# Patient Record
Sex: MALE | Race: BLACK OR AFRICAN AMERICAN | NOT HISPANIC OR LATINO
[De-identification: names, ages, dates, MRNs, and addresses within clinical notes are randomized per-mention and may not be internally consistent; named-entity substitution may affect disease eponyms.]

---

## 2018-01-16 ENCOUNTER — NON-APPOINTMENT (OUTPATIENT)
Age: 28
End: 2018-01-16

## 2018-01-16 ENCOUNTER — LABORATORY RESULT (OUTPATIENT)
Age: 28
End: 2018-01-16

## 2018-01-16 ENCOUNTER — APPOINTMENT (OUTPATIENT)
Dept: INTERNAL MEDICINE | Facility: CLINIC | Age: 28
End: 2018-01-16
Payer: COMMERCIAL

## 2018-01-16 VITALS
BODY MASS INDEX: 19.04 KG/M2 | TEMPERATURE: 98.4 F | HEART RATE: 74 BPM | DIASTOLIC BLOOD PRESSURE: 61 MMHG | HEIGHT: 71 IN | OXYGEN SATURATION: 99 % | WEIGHT: 136 LBS | SYSTOLIC BLOOD PRESSURE: 122 MMHG

## 2018-01-16 DIAGNOSIS — F17.200 NICOTINE DEPENDENCE, UNSPECIFIED, UNCOMPLICATED: ICD-10-CM

## 2018-01-16 DIAGNOSIS — Z78.9 OTHER SPECIFIED HEALTH STATUS: ICD-10-CM

## 2018-01-16 DIAGNOSIS — Z11.3 ENCOUNTER FOR SCREENING FOR INFECTIONS WITH A PREDOMINANTLY SEXUAL MODE OF TRANSMISSION: ICD-10-CM

## 2018-01-16 DIAGNOSIS — R00.1 BRADYCARDIA, UNSPECIFIED: ICD-10-CM

## 2018-01-16 DIAGNOSIS — D22.9 MELANOCYTIC NEVI, UNSPECIFIED: ICD-10-CM

## 2018-01-16 PROCEDURE — 93000 ELECTROCARDIOGRAM COMPLETE: CPT

## 2018-01-16 PROCEDURE — 99385 PREV VISIT NEW AGE 18-39: CPT | Mod: 25

## 2018-01-16 PROCEDURE — 92552 PURE TONE AUDIOMETRY AIR: CPT

## 2018-01-16 PROCEDURE — G0444 DEPRESSION SCREEN ANNUAL: CPT

## 2018-01-17 LAB
25(OH)D3 SERPL-MCNC: 12.7 NG/ML
ALBUMIN SERPL ELPH-MCNC: 4.3 G/DL
ALP BLD-CCNC: 46 U/L
ALT SERPL-CCNC: 5 U/L
ANION GAP SERPL CALC-SCNC: 11 MMOL/L
APPEARANCE: CLEAR
AST SERPL-CCNC: 13 U/L
BASOPHILS # BLD AUTO: 0.04 K/UL
BASOPHILS NFR BLD AUTO: 1.5 %
BILIRUB SERPL-MCNC: 0.6 MG/DL
BILIRUBIN URINE: NEGATIVE
BLOOD URINE: NEGATIVE
BUN SERPL-MCNC: 11 MG/DL
C TRACH RRNA SPEC QL NAA+PROBE: NOT DETECTED
CALCIUM SERPL-MCNC: 9.7 MG/DL
CHLORIDE SERPL-SCNC: 103 MMOL/L
CHOLEST SERPL-MCNC: 157 MG/DL
CHOLEST/HDLC SERPL: 2.4 RATIO
CO2 SERPL-SCNC: 28 MMOL/L
COLOR: YELLOW
CREAT SERPL-MCNC: 0.98 MG/DL
EOSINOPHIL # BLD AUTO: 0.13 K/UL
EOSINOPHIL NFR BLD AUTO: 4.8 %
GLUCOSE QUALITATIVE U: NEGATIVE MG/DL
GLUCOSE SERPL-MCNC: 90 MG/DL
HBA1C MFR BLD HPLC: 5.6 %
HCT VFR BLD CALC: 41.9 %
HCV AB SER QL: NONREACTIVE
HCV S/CO RATIO: 0.07 S/CO
HDLC SERPL-MCNC: 65 MG/DL
HGB BLD-MCNC: 14 G/DL
HIV1+2 AB SPEC QL IA.RAPID: NONREACTIVE
HSV 1+2 IGG SER IA-IMP: NEGATIVE
HSV 1+2 IGG SER IA-IMP: NEGATIVE
HSV1 IGG SER QL: 0.11 INDEX
HSV2 IGG SER QL: 0.2 INDEX
IMM GRANULOCYTES NFR BLD AUTO: 0 %
KETONES URINE: NEGATIVE
LDLC SERPL CALC-MCNC: 79 MG/DL
LEUKOCYTE ESTERASE URINE: NEGATIVE
LYMPHOCYTES # BLD AUTO: 0.98 K/UL
LYMPHOCYTES NFR BLD AUTO: 35.9 %
MAN DIFF?: NORMAL
MCHC RBC-ENTMCNC: 30.7 PG
MCHC RBC-ENTMCNC: 33.4 GM/DL
MCV RBC AUTO: 91.9 FL
MONOCYTES # BLD AUTO: 0.33 K/UL
MONOCYTES NFR BLD AUTO: 12.1 %
N GONORRHOEA RRNA SPEC QL NAA+PROBE: NOT DETECTED
NEUTROPHILS # BLD AUTO: 1.25 K/UL
NEUTROPHILS NFR BLD AUTO: 45.7 %
NITRITE URINE: NEGATIVE
PH URINE: 5.5
PLATELET # BLD AUTO: 126 K/UL
POTASSIUM SERPL-SCNC: 3.9 MMOL/L
PROT SERPL-MCNC: 7.1 G/DL
PROTEIN URINE: NEGATIVE MG/DL
PSA SERPL-MCNC: 0.94 NG/ML
RBC # BLD: 4.56 M/UL
RBC # FLD: 13.7 %
SODIUM SERPL-SCNC: 142 MMOL/L
SOURCE AMPLIFICATION: NORMAL
SPECIFIC GRAVITY URINE: 1.02
T PALLIDUM AB SER QL IA: NEGATIVE
T4 FREE SERPL-MCNC: 1.1 NG/DL
TRIGL SERPL-MCNC: 67 MG/DL
TSH SERPL-ACNC: 0.77 UIU/ML
UROBILINOGEN URINE: NEGATIVE MG/DL
WBC # FLD AUTO: 2.73 K/UL

## 2018-01-17 RX ORDER — ERGOCALCIFEROL 1.25 MG/1
1.25 MG CAPSULE, LIQUID FILLED ORAL
Qty: 12 | Refills: 3 | Status: ACTIVE | COMMUNITY
Start: 2018-01-17 | End: 1900-01-01

## 2018-02-05 ENCOUNTER — APPOINTMENT (OUTPATIENT)
Dept: PULMONOLOGY | Facility: CLINIC | Age: 28
End: 2018-02-05
Payer: COMMERCIAL

## 2018-02-05 VITALS
TEMPERATURE: 97.4 F | HEIGHT: 71 IN | DIASTOLIC BLOOD PRESSURE: 70 MMHG | WEIGHT: 136 LBS | SYSTOLIC BLOOD PRESSURE: 114 MMHG | HEART RATE: 85 BPM | OXYGEN SATURATION: 97 % | BODY MASS INDEX: 19.04 KG/M2

## 2018-02-05 PROCEDURE — 99204 OFFICE O/P NEW MOD 45 MIN: CPT | Mod: 25

## 2018-02-05 PROCEDURE — 94010 BREATHING CAPACITY TEST: CPT

## 2018-02-15 ENCOUNTER — LABORATORY RESULT (OUTPATIENT)
Age: 28
End: 2018-02-15

## 2018-02-15 ENCOUNTER — APPOINTMENT (OUTPATIENT)
Dept: HEMATOLOGY ONCOLOGY | Facility: CLINIC | Age: 28
End: 2018-02-15
Payer: COMMERCIAL

## 2018-02-15 VITALS
SYSTOLIC BLOOD PRESSURE: 107 MMHG | DIASTOLIC BLOOD PRESSURE: 74 MMHG | WEIGHT: 130 LBS | TEMPERATURE: 97.9 F | HEART RATE: 82 BPM | OXYGEN SATURATION: 98 % | HEIGHT: 71 IN | RESPIRATION RATE: 14 BRPM | BODY MASS INDEX: 18.2 KG/M2

## 2018-02-15 PROCEDURE — 36415 COLL VENOUS BLD VENIPUNCTURE: CPT

## 2018-02-15 PROCEDURE — 99204 OFFICE O/P NEW MOD 45 MIN: CPT | Mod: 25

## 2018-02-15 RX ORDER — MULTIVIT-MIN/IRON/FOLIC ACID/K 18-600-40
CAPSULE ORAL DAILY
Refills: 0 | Status: ACTIVE | COMMUNITY

## 2018-02-16 ENCOUNTER — LABORATORY RESULT (OUTPATIENT)
Age: 28
End: 2018-02-16

## 2018-02-20 LAB
BASOPHILS NFR BLD AUTO: 2.2 %
EOSINOPHIL NFR BLD AUTO: 5.3 %
HCT VFR BLD CALC: 41.2 %
HGB BLD-MCNC: 13.9 G/DL
HIV1+2 AB SPEC QL IA.RAPID: NONREACTIVE
LYMPHOCYTES NFR BLD AUTO: 39.2 %
MAN DIFF?: NO
MCHC RBC-ENTMCNC: 30.1 PG
MCHC RBC-ENTMCNC: 33.7 G/DL
MCV RBC AUTO: 89.2 FL
MONOCYTES NFR BLD AUTO: 16.7 %
NEUTROPHILS NFR BLD AUTO: 36.6 %
PLATELET # BLD AUTO: 139 K/UL
RBC # BLD: 4.62 M/UL
RBC # FLD: 13.2 %
VIT B12 SERPL-MCNC: 536 PG/ML
WBC # FLD AUTO: 3.6 K/UL

## 2018-03-05 ENCOUNTER — APPOINTMENT (OUTPATIENT)
Dept: HEMATOLOGY ONCOLOGY | Facility: CLINIC | Age: 28
End: 2018-03-05
Payer: COMMERCIAL

## 2018-03-05 ENCOUNTER — LABORATORY RESULT (OUTPATIENT)
Age: 28
End: 2018-03-05

## 2018-03-05 PROCEDURE — 36415 COLL VENOUS BLD VENIPUNCTURE: CPT

## 2018-03-13 ENCOUNTER — TRANSCRIPTION ENCOUNTER (OUTPATIENT)
Age: 28
End: 2018-03-13

## 2018-07-23 PROBLEM — Z78.9 ALCOHOL USE: Status: ACTIVE | Noted: 2018-01-16

## 2019-01-18 ENCOUNTER — APPOINTMENT (OUTPATIENT)
Dept: INTERNAL MEDICINE | Facility: CLINIC | Age: 29
End: 2019-01-18
Payer: COMMERCIAL

## 2019-01-18 VITALS
BODY MASS INDEX: 18.55 KG/M2 | TEMPERATURE: 98.5 F | SYSTOLIC BLOOD PRESSURE: 110 MMHG | OXYGEN SATURATION: 98 % | HEART RATE: 89 BPM | WEIGHT: 133 LBS | DIASTOLIC BLOOD PRESSURE: 68 MMHG

## 2019-01-18 DIAGNOSIS — Z00.00 ENCOUNTER FOR GENERAL ADULT MEDICAL EXAMINATION W/OUT ABNORMAL FINDINGS: ICD-10-CM

## 2019-01-18 DIAGNOSIS — D69.3 IMMUNE THROMBOCYTOPENIC PURPURA: ICD-10-CM

## 2019-01-18 DIAGNOSIS — J93.83 OTHER PNEUMOTHORAX: ICD-10-CM

## 2019-01-18 DIAGNOSIS — D72.819 DECREASED WHITE BLOOD CELL COUNT, UNSPECIFIED: ICD-10-CM

## 2019-01-18 DIAGNOSIS — Z11.3 ENCOUNTER FOR SCREENING FOR INFECTIONS WITH A PREDOMINANTLY SEXUAL MODE OF TRANSMISSION: ICD-10-CM

## 2019-01-18 DIAGNOSIS — E55.9 VITAMIN D DEFICIENCY, UNSPECIFIED: ICD-10-CM

## 2019-01-18 PROCEDURE — 99395 PREV VISIT EST AGE 18-39: CPT | Mod: 25

## 2019-01-18 PROCEDURE — 36415 COLL VENOUS BLD VENIPUNCTURE: CPT

## 2019-01-18 NOTE — HISTORY OF PRESENT ILLNESS
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 PTX\par - was evaluated by pulm, records reviewed and cleared for EMT work\par - breathing is stable\par \par leukopenia/thrombocytopenia\par - evaluated by heme and cleared for EMT work\par \par HCM\par - flu up to date\par - needs letter stating can have short beard during EMT work.

## 2019-01-18 NOTE — HEALTH RISK ASSESSMENT
[Excellent] : ~his/her~  mood as  excellent [] : Yes [No falls in past year] : Patient reported no falls in the past year [0] : 2) Feeling down, depressed, or hopeless: Not at all (0) [de-identified] : KAREN [[id number]] : 0 [Change in mental status noted] : No change in mental status noted [Language] : denies difficulty with language [Behavior] : denies difficulty with behavior [Learning/Retaining New Information] : denies difficulty learning/retaining new information [Handling Complex Tasks] : denies difficulty handling complex tasks [Reasoning] : denies difficulty with reasoning [Spatial Ability and Orientation] : denies difficulty with spatial ability and orientation [College] : College [Sexually Active] : sexually active [High Risk Behavior] : no high risk behavior [Fully functional (bathing, dressing, toileting, transferring, walking, feeding)] : Fully functional (bathing, dressing, toileting, transferring, walking, feeding) [Fully functional (using the telephone, shopping, preparing meals, housekeeping, doing laundry, using] : Fully functional and needs no help or supervision to perform IADLs (using the telephone, shopping, preparing meals, housekeeping, doing laundry, using transportation, managing medications and managing finances) [Reports changes in hearing] : Reports no changes in hearing [Reports changes in vision] : Reports no changes in vision [Reports changes in dental health] : Reports no changes in dental health [Discussed at today's visit] : Advance Directives Discussed at today's visit

## 2019-01-24 LAB
25(OH)D3 SERPL-MCNC: 49.3 NG/ML
ALBUMIN SERPL ELPH-MCNC: 5.1 G/DL
ALP BLD-CCNC: 56 U/L
ALT SERPL-CCNC: 12 U/L
ANION GAP SERPL CALC-SCNC: 12 MMOL/L
APPEARANCE: CLEAR
AST SERPL-CCNC: 18 U/L
BASOPHILS # BLD AUTO: 0.06 K/UL
BASOPHILS NFR BLD AUTO: 1.8 %
BILIRUB SERPL-MCNC: 0.4 MG/DL
BILIRUBIN URINE: NEGATIVE
BLOOD URINE: NEGATIVE
BUN SERPL-MCNC: 12 MG/DL
C TRACH RRNA SPEC QL NAA+PROBE: NOT DETECTED
CALCIUM SERPL-MCNC: 10.3 MG/DL
CHLORIDE SERPL-SCNC: 99 MMOL/L
CHOLEST SERPL-MCNC: 171 MG/DL
CHOLEST/HDLC SERPL: 2.4 RATIO
CO2 SERPL-SCNC: 28 MMOL/L
COLOR: YELLOW
CREAT SERPL-MCNC: 0.89 MG/DL
EOSINOPHIL # BLD AUTO: 0.11 K/UL
EOSINOPHIL NFR BLD AUTO: 3.3 %
GLUCOSE QUALITATIVE U: NEGATIVE MG/DL
GLUCOSE SERPL-MCNC: 86 MG/DL
HBA1C MFR BLD HPLC: 5.8 %
HCT VFR BLD CALC: 40.8 %
HDLC SERPL-MCNC: 71 MG/DL
HGB BLD-MCNC: 13.4 G/DL
HIV1+2 AB SPEC QL IA.RAPID: NONREACTIVE
HSV 1+2 IGG SER IA-IMP: NEGATIVE
HSV 1+2 IGG SER IA-IMP: NEGATIVE
HSV1 IGG SER QL: 0.13 INDEX
HSV2 IGG SER QL: 0.31 INDEX
IMM GRANULOCYTES NFR BLD AUTO: 0.3 %
KETONES URINE: NEGATIVE
LDLC SERPL CALC-MCNC: 92 MG/DL
LEUKOCYTE ESTERASE URINE: NEGATIVE
LYMPHOCYTES # BLD AUTO: 1.4 K/UL
LYMPHOCYTES NFR BLD AUTO: 42.6 %
MAN DIFF?: NORMAL
MCHC RBC-ENTMCNC: 29.5 PG
MCHC RBC-ENTMCNC: 32.8 GM/DL
MCV RBC AUTO: 89.7 FL
MONOCYTES # BLD AUTO: 0.29 K/UL
MONOCYTES NFR BLD AUTO: 8.8 %
N GONORRHOEA RRNA SPEC QL NAA+PROBE: NOT DETECTED
NEUTROPHILS # BLD AUTO: 1.42 K/UL
NEUTROPHILS NFR BLD AUTO: 43.2 %
NITRITE URINE: NEGATIVE
PH URINE: 5.5
PLATELET # BLD AUTO: 137 K/UL
POTASSIUM SERPL-SCNC: 3.6 MMOL/L
PROT SERPL-MCNC: 8.4 G/DL
PROTEIN URINE: NEGATIVE MG/DL
PSA SERPL-MCNC: 0.9 NG/ML
RBC # BLD: 4.55 M/UL
RBC # FLD: 14.6 %
SODIUM SERPL-SCNC: 139 MMOL/L
SOURCE AMPLIFICATION: NORMAL
SPECIFIC GRAVITY URINE: 1.02
T PALLIDUM AB SER QL IA: NEGATIVE
T4 FREE SERPL-MCNC: 1.2 NG/DL
TRIGL SERPL-MCNC: 40 MG/DL
TSH SERPL-ACNC: 1.32 UIU/ML
UROBILINOGEN URINE: NEGATIVE MG/DL
WBC # FLD AUTO: 3.29 K/UL

## 2021-10-31 ENCOUNTER — TRANSCRIPTION ENCOUNTER (OUTPATIENT)
Age: 31
End: 2021-10-31

## 2021-12-17 ENCOUNTER — TRANSCRIPTION ENCOUNTER (OUTPATIENT)
Age: 31
End: 2021-12-17

## 2022-09-12 ENCOUNTER — NON-APPOINTMENT (OUTPATIENT)
Age: 32
End: 2022-09-12

## 2023-01-18 ENCOUNTER — NON-APPOINTMENT (OUTPATIENT)
Age: 33
End: 2023-01-18

## 2024-06-19 ENCOUNTER — NON-APPOINTMENT (OUTPATIENT)
Age: 34
End: 2024-06-19

## 2024-10-22 ENCOUNTER — NON-APPOINTMENT (OUTPATIENT)
Age: 34
End: 2024-10-22

## 2024-12-25 PROBLEM — F10.90 ALCOHOL USE: Status: ACTIVE | Noted: 2018-01-16

## 2025-01-20 ENCOUNTER — NON-APPOINTMENT (OUTPATIENT)
Age: 35
End: 2025-01-20